# Patient Record
Sex: FEMALE
[De-identification: names, ages, dates, MRNs, and addresses within clinical notes are randomized per-mention and may not be internally consistent; named-entity substitution may affect disease eponyms.]

---

## 2019-07-03 ENCOUNTER — HOSPITAL ENCOUNTER (OUTPATIENT)
Dept: HOSPITAL 95 - LAB SHORT | Age: 39
Discharge: HOME | End: 2019-07-03
Attending: NURSE PRACTITIONER
Payer: SELF-PAY

## 2019-07-03 DIAGNOSIS — N76.0: ICD-10-CM

## 2019-07-03 DIAGNOSIS — Z01.419: Primary | ICD-10-CM

## 2019-07-03 PROCEDURE — G0123 SCREEN CERV/VAG THIN LAYER: HCPCS

## 2019-07-05 LAB — OTHER STN SPEC: (no result)

## 2021-03-15 NOTE — NUR
SHIFT SUMMARY
POD#1 LAP APPI. AAOX4. DISCOMFORT CONTROLLED WITH 2 NORCO Q4P. NO
NAUSEA/EMESIS. ABD INCISIONS X3 C/D/I. GOOD PO INTAKE + OUTPUT. INDEPENDENT IN
ROOM. IV ABX PER ORDERS. RESTED WELL T/O NIGHT.

## 2021-03-15 NOTE — NUR
DC'D HOME, DC INSTRUCTIONS GIVEN, VERBALIZED UNDERSTANDING, REPORTS HAVING
ADEQUATE PAIN CONTROL WITH NORCO.

## 2021-03-15 NOTE — NUR
A&OX3, REPORTS TOLERATING DIET WELL, C/O 5/10 INCISIONAL PAIN, MEDICATED WITH
NORCO AND TORADOL, DENIES ANY NAUSEA, CONT. TO MONITOR FOR ANY CHANGES.